# Patient Record
Sex: FEMALE | URBAN - METROPOLITAN AREA
[De-identification: names, ages, dates, MRNs, and addresses within clinical notes are randomized per-mention and may not be internally consistent; named-entity substitution may affect disease eponyms.]

---

## 2024-11-25 ENCOUNTER — TELEPHONE (OUTPATIENT)
Dept: CARDIOLOGY | Facility: MEDICAL CENTER | Age: 64
End: 2024-11-25
Payer: COMMERCIAL

## 2024-11-25 NOTE — TELEPHONE ENCOUNTER
Called patient to request records for NP appointment with CW. Called to confirm if this will be first time patient is seeing a cardiologist. No answer, left voicemail to call back.

## 2024-11-26 NOTE — TELEPHONE ENCOUNTER
Requested medical records from     Naif Blood   Fax: [phone]     Fax confirmation received and scanned into patients chart     Spoke to patient regarding records being requested, Patient did let us know [facility] sometimes has issues with medical records and to let her know if we don't hear back from them.

## 2024-11-26 NOTE — TELEPHONE ENCOUNTER
Caller: Christal Rodríguez    Topic/issue: Previous Cardiologist:   Dr. Blood @ [facility]. Did not have any contact information.  Labs done at [facility] at [facility] in September, she is hand carrying copies to appointment.  Had either an ECG or EKG in 2023 in [facility].    Callback Number: [phone]    Thank you,  Ruthy MITCHELL

## 2024-11-26 NOTE — TELEPHONE ENCOUNTER
ADD  Caller: Christal Rodríguez    Topic/issue: Patient would like to provide the fax number. Patient stated that you can call her if you have any problems.     Dr. Blood  Fax: [phone]    Callback Number: [phone]    Thank you,  Reva ZARAGOZA

## 2024-12-02 ENCOUNTER — OFFICE VISIT (OUTPATIENT)
Dept: CARDIOLOGY | Facility: MEDICAL CENTER | Age: 64
End: 2024-12-02
Attending: INTERNAL MEDICINE
Payer: COMMERCIAL

## 2024-12-02 VITALS
DIASTOLIC BLOOD PRESSURE: 70 MMHG | SYSTOLIC BLOOD PRESSURE: 140 MMHG | OXYGEN SATURATION: 95 % | WEIGHT: 227 LBS | HEART RATE: 66 BPM | BODY MASS INDEX: 38.76 KG/M2 | HEIGHT: 64 IN | RESPIRATION RATE: 16 BRPM

## 2024-12-02 DIAGNOSIS — I10 HYPERTENSION, UNSPECIFIED TYPE: ICD-10-CM

## 2024-12-02 DIAGNOSIS — E66.09 CLASS 2 OBESITY DUE TO EXCESS CALORIES WITHOUT SERIOUS COMORBIDITY WITH BODY MASS INDEX (BMI) OF 38.0 TO 38.9 IN ADULT: ICD-10-CM

## 2024-12-02 DIAGNOSIS — E78.5 DYSLIPIDEMIA: ICD-10-CM

## 2024-12-02 DIAGNOSIS — I10 PRIMARY HYPERTENSION: ICD-10-CM

## 2024-12-02 DIAGNOSIS — G47.33 OSA ON CPAP: ICD-10-CM

## 2024-12-02 DIAGNOSIS — E66.812 CLASS 2 OBESITY DUE TO EXCESS CALORIES WITHOUT SERIOUS COMORBIDITY WITH BODY MASS INDEX (BMI) OF 38.0 TO 38.9 IN ADULT: ICD-10-CM

## 2024-12-02 LAB — EKG IMPRESSION: NORMAL

## 2024-12-02 PROCEDURE — 99204 OFFICE O/P NEW MOD 45 MIN: CPT | Performed by: INTERNAL MEDICINE

## 2024-12-02 PROCEDURE — 3078F DIAST BP <80 MM HG: CPT | Performed by: INTERNAL MEDICINE

## 2024-12-02 PROCEDURE — 93005 ELECTROCARDIOGRAM TRACING: CPT | Mod: TC | Performed by: INTERNAL MEDICINE

## 2024-12-02 PROCEDURE — 3077F SYST BP >= 140 MM HG: CPT | Performed by: INTERNAL MEDICINE

## 2024-12-02 PROCEDURE — 93010 ELECTROCARDIOGRAM REPORT: CPT | Performed by: INTERNAL MEDICINE

## 2024-12-02 PROCEDURE — 99202 OFFICE O/P NEW SF 15 MIN: CPT | Performed by: INTERNAL MEDICINE

## 2024-12-02 RX ORDER — AMLODIPINE BESYLATE 5 MG/1
TABLET ORAL
COMMUNITY
End: 2024-12-02 | Stop reason: SDUPTHER

## 2024-12-02 RX ORDER — AMLODIPINE BESYLATE 5 MG/1
5 TABLET ORAL DAILY
Qty: 90 TABLET | Refills: 3 | Status: SHIPPED | OUTPATIENT
Start: 2024-12-02

## 2024-12-02 RX ORDER — CHLORTHALIDONE 25 MG/1
TABLET ORAL
COMMUNITY
Start: 2024-09-16 | End: 2024-12-02 | Stop reason: SDUPTHER

## 2024-12-02 RX ORDER — CHLORTHALIDONE 25 MG/1
25 TABLET ORAL DAILY
Qty: 90 TABLET | Refills: 3 | Status: SHIPPED | OUTPATIENT
Start: 2024-12-02

## 2024-12-02 RX ORDER — LOSARTAN POTASSIUM 50 MG/1
TABLET ORAL
COMMUNITY
End: 2024-12-02 | Stop reason: SDUPTHER

## 2024-12-02 RX ORDER — LOSARTAN POTASSIUM 50 MG/1
50 TABLET ORAL DAILY
Qty: 90 TABLET | Refills: 3 | Status: SHIPPED | OUTPATIENT
Start: 2024-12-02

## 2024-12-02 ASSESSMENT — ENCOUNTER SYMPTOMS
PND: 0
BLURRED VISION: 0
BRUISES/BLEEDS EASILY: 0
FEVER: 0
NAUSEA: 0
COUGH: 0
WEAKNESS: 0
FALLS: 0
DIZZINESS: 0
PALPITATIONS: 0
ABDOMINAL PAIN: 0
CLAUDICATION: 0
CHILLS: 0
SHORTNESS OF BREATH: 0
FOCAL WEAKNESS: 0
SORE THROAT: 0

## 2024-12-02 NOTE — PROGRESS NOTES
Chief Complaint   Patient presents with    Hypertension       Subjective     Christal Rodríguez is a 64 y.o. female who presents today to establish care with HTN, RICO on CPAP    Had eval for HTN and concern CHF but BNPN and echo normal    Past Medical History:   Diagnosis Date    Dyslipidemia     RICO on CPAP     Primary hypertension      Past Surgical History:   Procedure Laterality Date    OTHER           Family History   Problem Relation Age of Onset    Heart Disease Maternal Cousin      Social History     Socioeconomic History    Marital status:      Spouse name: Not on file    Number of children: Not on file    Years of education: Not on file    Highest education level: Not on file   Occupational History    Not on file   Tobacco Use    Smoking status: Former     Types: Cigarettes    Smokeless tobacco: Never   Substance and Sexual Activity    Alcohol use: Yes     Comment: wine    Drug use: Never    Sexual activity: Not on file   Other Topics Concern    Not on file   Social History Narrative    Not on file     Social Drivers of Health     Financial Resource Strain: Not on file   Food Insecurity: Not on file   Transportation Needs: Not on file   Physical Activity: Not on file   Stress: Not on file   Social Connections: Not on file   Intimate Partner Violence: Not on file   Housing Stability: Not on file     Allergies   Allergen Reactions    Aspirin Hives     Outpatient Encounter Medications as of 2024   Medication Sig Dispense Refill    chlorthalidone (HYGROTON) 25 MG Tab 1 tablet in the morning with food Orally for 90 days      amLODIPine (NORVASC) 5 MG Tab       losartan (COZAAR) 50 MG Tab 1 tablet Orally Once a day for 90 days       No facility-administered encounter medications on file as of 2024.     Review of Systems   Constitutional:  Negative for chills and fever.   HENT:  Negative for sore throat.    Eyes:  Negative for blurred vision.   Respiratory:  Negative for cough and shortness  "of breath.    Cardiovascular:  Negative for chest pain, palpitations, claudication, leg swelling and PND.   Gastrointestinal:  Negative for abdominal pain and nausea.   Musculoskeletal:  Negative for falls and joint pain.   Skin:  Negative for rash.   Neurological:  Negative for dizziness, focal weakness and weakness.   Endo/Heme/Allergies:  Does not bruise/bleed easily.              Objective     BP (!) 140/70 (BP Location: Left arm, Patient Position: Sitting, BP Cuff Size: Adult)   Pulse 66   Resp 16   Ht 1.626 m (5' 4\")   Wt 103 kg (227 lb)   SpO2 95%   BMI 38.96 kg/m²     Physical Exam  Constitutional:       General: She is not in acute distress.     Appearance: She is not diaphoretic.   Eyes:      General: No scleral icterus.  Neck:      Vascular: No JVD.   Cardiovascular:      Rate and Rhythm: Normal rate.      Heart sounds: Normal heart sounds. No murmur heard.     No friction rub. No gallop.   Pulmonary:      Effort: No respiratory distress.      Breath sounds: No wheezing or rales.   Abdominal:      General: Bowel sounds are normal.      Palpations: Abdomen is soft.   Musculoskeletal:      Right lower leg: No edema.      Left lower leg: No edema.   Skin:     Findings: No rash.   Neurological:      Mental Status: She is alert. Mental status is at baseline.   Psychiatric:         Mood and Affect: Mood normal.            We reviewed in person the most recent labs  Recent Results (from the past 30 weeks)   EKG    Collection Time: 24  9:21 AM   Result Value Ref Range    Report       [facility]    Test Date:  2024  Pt Name:    KAREN DAILY               Department: [facility]  MRN:        [id number]                      Room:  Gender:     Female                       Technician: [facility]  :        1960                   Requested By:KOFI CESPEDES  Order #:    [id number]                    Reading MD:    Measurements  Intervals                                Axis  Rate: "       54                           P:          74  VA:         156                          QRS:        77  QRSD:       91                           T:          3  QT:         428  QTc:        406    Interpretive Statements  Sinus bradycardia  Repol abnrm suggests ischemia, diffuse leads  No previous ECG available for comparison           Assessment & Plan     1. Hypertension, unspecified type  EKG      2. Primary hypertension  CT-CARDIAC SCORING    amLODIPine (NORVASC) 5 MG Tab    chlorthalidone (HYGROTON) 25 MG Tab    losartan (COZAAR) 50 MG Tab      3. RICO on CPAP        4. Dyslipidemia  CT-CARDIAC SCORING      5. Class 2 obesity due to excess calories without serious comorbidity with body mass index (BMI) of 38.0 to 38.9 in adult            Medical Decision Making: Today's Assessment/Status/Plan:      It was my pleasure to meet with Ms. Rodríguez.    We addressed the management of hypertension at today's visit. Blood pressure is well controlled.  We specifically assessed the labs on hypertension treatment    Lipids are good with weight loss    We discussed the importance of meaningful weight loss.    NO EVIDENCE OF HEART FAILURE    Ms. Rodríguez does not require regular cardiology follow up, I have advised her to call our office or e-mail using my [employer] if needed.    It is my pleasure to participate in the care of Ms. Rodríguez.  Please do not hesitate to contact me with questions or concerns.    Alexandre Kruger MD PhD [facility]  Cardiologist [facility]    Please note that this dictation was created using voice recognition software. There may be errors I did not discover before finalizing the note.

## 2024-12-02 NOTE — PATIENT INSTRUCTIONS
Work on at least 2.5 - 5 hours a week of moderate exercise (typical brisk walking or similar activity)    If you have had a heart attack, stent, bypass or reduced heart strength (EF <35%): cardiac rehab may reduce your risk of dying by 13-24% and need to go to the hospital by 30% within the first year (1)    Please look into the following diets and incorporate them into your diet    LOW SALT DIET   KEEP YOUR SODIUM EQUAL TO CALORIES AND NO MORE THAN DOUBLE THE CALORIES FOR A LOW SALT DIET    Cardiosmart.org - great resource for American College of Cardiology on heart disease prevention and treatment    FOR TREATMENT OR PREVENTION OF CORONARY ARTERY DISEASE  These three programs are approved by Medicare/Insurers for those with heart disease  [facility] - [facility]  Dr. Farah's Program for Reversing Heart Disease - Jose Hung's Cardiologist vegetarian-based  [facility] - [place]-based mind-body Program    Mediterranean Diet has been shown to be a hearty healthy diet.    This is a commonly referenced Program  Dr Anand - [facility] (book and documentary) - vegetarian-based    FOR TREATMENT OF BLOOD PRESSURE  DASH DIET - American Heart Association for treatment of HYPERTENSION    FOR TREATMENT OF BAD CHOLESTEROL/FATS  REDUCE PROCESSED SUGAR AS MUCH AS POSSIBLE  INCREASE WHOLE GRAINS/VEGETABLES  INCREASE FIBER    Lowering total cholesterol and LDL (bad) cholesterol:  - Eat leaner cuts of meat, or eliminate altogether if possible red meat, and frequently substitute fish or chicken.  - Limit saturated fat to no more than 7-10% of total calories no more than 10 g per day is recommended. Some sources of saturated fat include butter, animal fats, hydrogenated vegetable fats and oils, many desserts, whole milk dairy products.  - Replaced saturated fats with polyunsaturated fats and monounsaturated fats. Foods high in monounsaturated fat include nuts, canola  oil, avocados, and olives.  - Limit trans fat (processed foods) and replaced with fresh fruits and vegetables  - Recommend nonfat dairy products  - Increase substantially the amount of soluble fiber intake (legumes such as beans, fruit, whole grains).  - Consider nutritional supplements: plant sterile spreads such as Benecol, fish oil,  flaxseed oil, omega-3 acids capsules 1000 mg twice a day, or viscous fiber such as Metamucil  - Attain ideal weight and regular exercise (at least 30 minutes per day of moderate exercise)  ASK ABOUT STATIN OR NON STATIN MEDICATION TO REDUCE YOUR LDL AND HEART RISK    Lowering triglycerides:  - Reduce intake of simple sugar: Desserts, candy, pastries, honey, sodas, sugared cereals, yogurt, Gatorade, sports bars, canned fruit, smoothies, fruit juice, coffee drinks  - Reduced intake of refined starches: Refined Pasta, most bread  - Reduce or abstain from alcohol  - Increase omega-3 fatty acids: Hodgen, Trout, Mackerel, Herring, Albacore tuna and supplements  - Attain ideal weight and regular exercise (at least 30 minutes per day of moderate exercise)  ASK ABOUT PURIFIED OMEGA 3 EPA or FISH OIL TO REDUCE YOUR TG AND HEART RISK    Elevating HDL (good) cholesterol:  - Increase physical activity  - Increase omega-3 fatty acids and supplements as listed above  - Incorporating appropriate amounts of monounsaturated fats such as nuts, olive oil, canola oil, avocados, olives  - Stop smoking  - Attain ideal weight and regular exercise (at least 30 minutes per day of moderate exercise)